# Patient Record
Sex: FEMALE | Race: WHITE | NOT HISPANIC OR LATINO | Employment: STUDENT | URBAN - METROPOLITAN AREA
[De-identification: names, ages, dates, MRNs, and addresses within clinical notes are randomized per-mention and may not be internally consistent; named-entity substitution may affect disease eponyms.]

---

## 2017-08-15 ENCOUNTER — OFFICE VISIT (OUTPATIENT)
Dept: CARDIOLOGY | Facility: MEDICAL CENTER | Age: 22
End: 2017-08-15
Payer: COMMERCIAL

## 2017-08-15 VITALS
DIASTOLIC BLOOD PRESSURE: 66 MMHG | HEIGHT: 66 IN | BODY MASS INDEX: 23.3 KG/M2 | SYSTOLIC BLOOD PRESSURE: 110 MMHG | HEART RATE: 74 BPM | WEIGHT: 145 LBS | OXYGEN SATURATION: 97 %

## 2017-08-15 DIAGNOSIS — R00.2 PALPITATIONS: ICD-10-CM

## 2017-08-15 DIAGNOSIS — Q21.12 PFO (PATENT FORAMEN OVALE): ICD-10-CM

## 2017-08-15 LAB — EKG IMPRESSION: NORMAL

## 2017-08-15 PROCEDURE — 93000 ELECTROCARDIOGRAM COMPLETE: CPT | Performed by: NURSE PRACTITIONER

## 2017-08-15 PROCEDURE — 99214 OFFICE O/P EST MOD 30 MIN: CPT | Performed by: NURSE PRACTITIONER

## 2017-08-15 ASSESSMENT — ENCOUNTER SYMPTOMS
FEVER: 0
MYALGIAS: 0
ORTHOPNEA: 0
NAUSEA: 0
PALPITATIONS: 1
BRUISES/BLEEDS EASILY: 0
PND: 0
LOSS OF CONSCIOUSNESS: 0
SHORTNESS OF BREATH: 0
ABDOMINAL PAIN: 0
HEADACHES: 0
DIZZINESS: 0
COUGH: 0
CHILLS: 0

## 2017-08-15 NOTE — MR AVS SNAPSHOT
"Estrella Sanders   8/15/2017 11:40 AM   Office Visit   MRN: [id number]    Department:  Heart [facility]   Dept Phone:  [phone]    Description:  Female : 1995   Provider:  SHALINI Jacinto           Reason for Visit     New Patient           Allergies as of 8/15/2017     Allergen Noted Reactions    Codeine 2016       Vomiting, rash         You were diagnosed with     PFO (patent foramen ovale)   [471025]         Vital Signs     Blood Pressure Pulse Height Weight Body Mass Index Oxygen Saturation    110/66 mmHg 74 1.676 m (5' 5.98\") 65.772 kg (145 lb) 23.41 kg/m2 97%    Smoking Status                   Never Smoker            Basic Information     Date Of Birth Sex Race Ethnicity Preferred Language    1995 Female White Non- English      Problem List              ICD-10-CM Priority Class Noted - Resolved    PFO (patent foramen ovale) Q21.1   2016 - Present      Health Maintenance        Date Due Completion Dates    IMM HEP B VACCINE (1 of 3 - Primary Series) 1995 ---    IMM HEP A VACCINE (1 of 2 - Standard Series) 1998 ---    IMM HPV VACCINE (1 of 3 - Female 3 Dose Series) 2008 ---    IMM VARICELLA (CHICKENPOX) VACCINE (1 of 2 - 2 Dose Adolescent Series) 2010 ---    IMM MENINGOCOCCAL VACCINE (MCV4) (1 of 1) 2013 ---    IMM DTaP/Tdap/Td Vaccine (1 - Tdap) 2016 ---    IMM INFLUENZA (1) 2017 ---            Results       Current Immunizations     No immunizations on file.      Below and/or attached are the medications your provider expects you to take. Review all of your home medications and newly ordered medications with your provider and/or pharmacist. Follow medication instructions as directed by your provider and/or pharmacist. Please keep your medication list with you and share with your provider. Update the information when medications are discontinued, doses are changed, or new medications (including over-the-counter products) are " added; and carry medication information at all times in the event of emergency situations     Allergies:  CODEINE - (reactions not documented)               Medications  Valid as of: August 15, 2017 - 11:52 AM    Generic Name Brand Name Tablet Size Instructions for use    .                 Medicines prescribed today were sent to:     [facility] [place], [place] - [address] [facility]    [address] [address] [facility] [address] [place] [address]    Phone: [phone] Fax: [phone]    Open 24 Hours?: No      Medication refill instructions:       If your prescription bottle indicates you have medication refills left, it is not necessary to call your provider’s office. Please contact your pharmacy and they will refill your medication.    If your prescription bottle indicates you do not have any refills left, you may request refills at any time through one of the following ways: The online [employer] system (except Urgent Care), by calling your provider’s office, or by asking your pharmacy to contact your provider’s office with a refill request. Medication refills are processed only during regular business hours and may not be available until the next business day. Your provider may request additional information or to have a follow-up visit with you prior to refilling your medication.   *Please Note: Medication refills are assigned a new Rx number when refilled electronically. Your pharmacy may indicate that no refills were authorized even though a new prescription for the same medication is available at the pharmacy. Please request the medicine by name with the pharmacy before contacting your provider for a refill.           [employer] Access Code: Activation code not generated  Current [employer] Status: Active

## 2017-08-15 NOTE — PROGRESS NOTES
Subjective:   Estrella Sanders is a 22 y.o. female who presents today for follow-up of occasional palpitations.    Estrella is a 22 year old female with history of PFO (with no ASD and no significant shunting) normally followed by Dr. Ramos. 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    She is here, as occasionally she notices a skip or fluttering of her heart, for only a few seconds, about once every 3 months. She can't remember anything about circumstances that cause it; no chest pain, pressure or discomfort; no shortness of breath, orthopnea or PND; no dizziness or syncope; no edema. She remains active in sports, and tolerates without any problems. No caffeine intake; no smoking or drug or alcohol use.    Past Medical History   Diagnosis Date   • PFO (patent foramen ovale) July 2016     EMILY with PFO, with no ASD and no significant shunting   • Skin cancer 1999   • Palpitations      Past Surgical History   Procedure Laterality Date   • Other  2015     wisdom teeth removal    • Other  1999     skin CA removal on head   • Recovery  7/8/2016     Procedure: CATH LAB EMILY WITH ANESTHESIA DR. NICHOLSON.L;  Surgeon: [facility];  [place] [facility] [place]  Service:      Family History   Problem Relation Age of Onset   • Heart Failure Neg Hx    • Heart Attack Neg Hx    • Stroke Neg Hx    • Heart Disease Neg Hx      History   Smoking status   • Never Smoker    Smokeless tobacco   • Never Used     Allergies   Allergen Reactions   • Codeine      Vomiting, rash        No outpatient encounter prescriptions on file as of 8/15/2017.     No facility-administered encounter medications on file as of 8/15/2017.     Review of Systems   Constitutional: Negative for fever and chills.   HENT: Negative for congestion.    Respiratory: Negative for cough and shortness of breath.    Cardiovascular: Positive for palpitations. Negative for chest  "pain, orthopnea, leg swelling and PND.        Very rare, nonsustained.   Gastrointestinal: Negative for nausea and abdominal pain.   Musculoskeletal: Negative for myalgias.   Skin: Negative for rash.   Neurological: Negative for dizziness, loss of consciousness and headaches.   Endo/Heme/Allergies: Does not bruise/bleed easily.        Objective:   /66 mmHg  Pulse 74  Ht 1.676 m (5' 5.98\")  Wt 65.772 kg (145 lb)  BMI 23.41 kg/m2  SpO2 97%    Physical Exam   Constitutional: She is oriented to person, place, and time. She appears well-developed and well-nourished. No distress.   HENT:   Head: Normocephalic.   Eyes: Conjunctivae and EOM are normal.   Neck: Normal range of motion. Neck supple. No JVD present. No thyromegaly present.   Cardiovascular: Normal rate, regular rhythm, normal heart sounds and intact distal pulses.  Exam reveals no gallop and no friction rub.    No murmur heard.  Pulmonary/Chest: Effort normal and breath sounds normal. No respiratory distress.   Abdominal: Soft. Bowel sounds are normal. There is no tenderness.   Musculoskeletal: Normal range of motion. She exhibits no edema.   Neurological: She is alert and oriented to person, place, and time.   Skin: Skin is warm and dry. No rash noted. No erythema.   Psychiatric: She has a normal mood and affect. Her behavior is normal.     EKG today reveals sinus rhythm at 64 bpm with no acute ST-T wave changes.    Assessment:     1. PFO (patent foramen ovale)  [facility]   2. Palpitations         Medical Decision Making:  Today's Assessment / Status / Plan:     1. PFO on EMILY, with no ASD or shunting, followed by Dr. Ramos.    2. Palpitations, rare/nonsustained. Reassured regarding EKG today. Discussed possible holter monitor, but she is leaving for college tomorrow. Urged to keep track of any symptoms and circumstances, and let us know whether it is happening more frequently or not. Can consider holter monitor when " she returns, or sooner, if clinical condition changes.    FU as already scheduled.    Collaborating MD: Vaibhav

## 2017-08-15 NOTE — Clinical Note
[facility]   [address]   [address]  [place] [address]  Phone: [phone]  Fax: [phone]              Estrella Sanders  1995    Encounter Date: 8/15/2017    SHALINI Jacinto          PROGRESS NOTE:  Subjective:   Estrella Sanders is a 22 y.o. female who presents today for follow-up of occasional palpitations.    Estrella is a 22 year old female with history of PFO (with no ASD and no significant shunting) normally followed by Dr. Ramos. 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    She is here, as occasionally she notices a skip or fluttering of her heart, for only a few seconds, about once every 3 months. She can't remember anything about circumstances that cause it; no chest pain, pressure or discomfort; no shortness of breath, orthopnea or PND; no dizziness or syncope; no edema. She remains active in sports, and tolerates without any problems. No caffeine intake; no smoking, drug or alcohol use.    Past Medical History   Diagnosis Date   • PFO (patent foramen ovale) July 2016     EMILY with PFO, with no ASD and no significant shunting   • Skin cancer 1999   • Palpitations      Past Surgical History   Procedure Laterality Date   • Other  2015     wisdom teeth removal    • Other  1999     skin CA removal on head   • Recovery  7/8/2016     Procedure: CATH LAB EMILY WITH ANESTHESIA DR. LARIOS;  [facility];  [place]: [facility] [facility] [facility] [place];  Service:      Family History   Problem Relation Age of Onset   • Heart Failure Neg Hx    • Heart Attack Neg Hx    • Stroke Neg Hx    • Heart Disease Neg Hx      History   Smoking status   • Never Smoker    Smokeless tobacco   • Never Used     Allergies   Allergen Reactions   • Codeine      Vomiting, rash        No outpatient encounter prescriptions on file as of 8/15/2017.     No  "facility-administered encounter medications on file as of 8/15/2017.     Review of Systems   Constitutional: Negative for fever and chills.   HENT: Negative for congestion.    Respiratory: Negative for cough and shortness of breath.    Cardiovascular: Positive for palpitations. Negative for chest pain, orthopnea, leg swelling and PND.        Very rare, nonsustained.   Gastrointestinal: Negative for nausea and abdominal pain.   Musculoskeletal: Negative for myalgias.   Skin: Negative for rash.   Neurological: Negative for dizziness, loss of consciousness and headaches.   Endo/Heme/Allergies: Does not bruise/bleed easily.        Objective:   /66 mmHg  Pulse 74  Ht 1.676 m (5' 5.98\")  Wt 65.772 kg (145 lb)  BMI 23.41 kg/m2  SpO2 97%    Physical Exam   Constitutional: She is oriented to person, place, and time. She appears well-developed and well-nourished. No distress.   HENT:   Head: Normocephalic.   Eyes: Conjunctivae and EOM are normal.   Neck: Normal range of motion. Neck supple. No JVD present. No thyromegaly present.   Cardiovascular: Normal rate, regular rhythm, normal heart sounds and intact distal pulses.  Exam reveals no gallop and no friction rub.    No murmur heard.  Pulmonary/Chest: Effort normal and breath sounds normal. No respiratory distress.   Abdominal: Soft. Bowel sounds are normal. There is no tenderness.   Musculoskeletal: Normal range of motion. She exhibits no edema.   Neurological: She is alert and oriented to person, place, and time.   Skin: Skin is warm and dry. No rash noted. No erythema.   Psychiatric: She has a normal mood and affect. Her behavior is normal.     EKG today reveals sinus rhythm at 64 bpm with no acute ST-T wave changes.    Assessment:     1. PFO (patent foramen ovale)  [facility] E[facility] (Clinic Performed)   2. Palpitations         Medical Decision Making:  Today's Assessment / Status / Plan:     1. PFO on EMILY, with no ASD or shunting, followed by Dr." Richard.    2. Palpitations, rare/nonsustained. Reassured regarding EKG today. Discussed possible holter monitor, but she is leaving for college tomorrow. Urged to keep track of any symptoms and circumstances, and let us know whether it is happening more frequently or not. Can consider holter monitor when she returns, or sooner, if clinical condition changes.    FU as already scheduled.    Collaborating MD: Vaibhav Bowens

## 2018-04-25 ENCOUNTER — EH NON-PROVIDER (OUTPATIENT)
Dept: OCCUPATIONAL MEDICINE | Facility: CLINIC | Age: 23
End: 2018-04-25

## 2018-04-25 ENCOUNTER — HOSPITAL ENCOUNTER (OUTPATIENT)
Facility: MEDICAL CENTER | Age: 23
End: 2018-04-25
Attending: PREVENTIVE MEDICINE
Payer: COMMERCIAL

## 2018-04-25 DIAGNOSIS — Z02.89 VISIT FOR OCCUPATIONAL HEALTH EXAMINATION: ICD-10-CM

## 2018-04-25 PROCEDURE — 86480 TB TEST CELL IMMUN MEASURE: CPT | Performed by: PREVENTIVE MEDICINE

## 2018-04-27 LAB
M TB TUBERC IFN-G BLD QL: NEGATIVE
M TB TUBERC IFN-G/MITOGEN IGNF BLD: 0.02
M TB TUBERC IGNF/MITOGEN IGNF CONTROL: 47.76 [IU]/ML
MITOGEN IGNF BCKGRD COR BLD-ACNC: 0.02 [IU]/ML

## 2018-05-09 ENCOUNTER — EH NON-PROVIDER (OUTPATIENT)
Dept: OCCUPATIONAL MEDICINE | Facility: CLINIC | Age: 23
End: 2018-05-09

## 2018-05-09 DIAGNOSIS — Z71.85 IMMUNIZATION COUNSELING: ICD-10-CM

## 2018-05-09 PROCEDURE — 8916 PR CLEARANCE ONLY: Performed by: PREVENTIVE MEDICINE

## 2018-06-12 ENCOUNTER — OFFICE VISIT (OUTPATIENT)
Dept: CARDIOLOGY | Facility: MEDICAL CENTER | Age: 23
End: 2018-06-12
Payer: COMMERCIAL

## 2018-06-12 VITALS
DIASTOLIC BLOOD PRESSURE: 62 MMHG | HEIGHT: 66 IN | SYSTOLIC BLOOD PRESSURE: 102 MMHG | OXYGEN SATURATION: 100 % | HEART RATE: 76 BPM

## 2018-06-12 DIAGNOSIS — Z83.49 FAMILY HISTORY OF HYPOTHYROIDISM: ICD-10-CM

## 2018-06-12 DIAGNOSIS — Q21.12 PFO (PATENT FORAMEN OVALE): ICD-10-CM

## 2018-06-12 DIAGNOSIS — R06.09 DYSPNEA ON EXERTION: ICD-10-CM

## 2018-06-12 DIAGNOSIS — R00.2 PALPITATIONS: ICD-10-CM

## 2018-06-12 PROCEDURE — 99214 OFFICE O/P EST MOD 30 MIN: CPT | Performed by: NURSE PRACTITIONER

## 2018-06-12 ASSESSMENT — ENCOUNTER SYMPTOMS
COUGH: 0
WHEEZING: 1
FEVER: 0
PALPITATIONS: 1
SHORTNESS OF BREATH: 1
ABDOMINAL PAIN: 0
PND: 0
CLAUDICATION: 0
MYALGIAS: 0
ORTHOPNEA: 0

## 2018-06-12 NOTE — PROGRESS NOTES
Chief Complaint   Patient presents with   • Atrial Septal Defect     Subjective:   Estrella Sanders is a 22 y.o. female who presents  today for follow up for incidental finding of PFO on sports clearance echocardiogram.    She is a prior patient of Dr. Ramos, she is wanting to establish with Dr. Ysabel Nicholson in our office.    Hx of PFO (small) with no shunting, palpitations, chest pain, and HOLT.    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    She states she does get some exertional wheezing and dyspnea with very heavy exertion.     She also gets occasional palpitations and has noticed that when she laughs too hard, she gets chest pain that hurts with deep breathing.    Past Medical History:   Diagnosis Date   • Palpitations    • PFO (patent foramen ovale) 07/2016    EMILY with PFO, with no ASD and no significant shunting   • Skin cancer 1999     Past Surgical History:   Procedure Laterality Date   • RECOVERY  7/8/2016    Procedure: CATH LAB EMILY WITH ANESTHESIA DR. NICHOLSON.L;  Surgeon: [facility];  [place] [facility] [place]  Service:    • OTHER  2015    wisdom teeth removal    • OTHER  1999    skin CA removal on head     Family History   Problem Relation Age of Onset   • Heart Failure Neg Hx    • Heart Attack Neg Hx    • Stroke Neg Hx    • Heart Disease Neg Hx      Social History     Social History   • Marital status: Single     Spouse name: N/A   • Number of children: N/A   • Years of education: N/A     Occupational History   • Not on file.     Social History Main Topics   • Smoking status: Never Smoker   • Smokeless tobacco: Never Used   • Alcohol use No   • Drug use: No   • Sexual activity: Not on file     Other Topics Concern   • Not on file     Social History Narrative   • No narrative on file     Allergies   Allergen Reactions   • Codeine      Vomiting, rash        No outpatient encounter prescriptions on file as of 6/12/2018.     No  "facility-administered encounter medications on file as of 6/12/2018.      Review of Systems   Constitutional: Negative for fever and malaise/fatigue.   Respiratory: Positive for shortness of breath and wheezing. Negative for cough.         With heavy exertion causing wheezing   Cardiovascular: Positive for chest pain and palpitations. Negative for orthopnea, claudication, leg swelling and PND.        Chest pain after laughing too hard with inhalation, palpitations rare   Gastrointestinal: Negative for abdominal pain.   Musculoskeletal: Negative for myalgias.        Objective:   /62   Pulse 76   Ht 1.676 m (5' 5.98\")   SpO2 100%     Physical Exam   Constitutional: She is oriented to person, place, and time. She appears well-developed and well-nourished.   HENT:   Head: Normocephalic and atraumatic.   Eyes: EOM are normal.   Neck: No JVD present.   Cardiovascular: Normal rate, regular rhythm, normal heart sounds and intact distal pulses.    Pulmonary/Chest: Effort normal and breath sounds normal.   Musculoskeletal: Normal range of motion. She exhibits no edema.   Neurological: She is alert and oriented to person, place, and time.   Skin: Skin is warm and dry.   Nursing note and vitals reviewed.      Assessment:     1. Palpitations  ECHOCARDIOGRAM COMP W/O CONT    CBC WITHOUT DIFFERENTIAL    COMP METABOLIC PANEL    THYROID PANEL WITH TSH    MAGNESIUM   2. PFO (patent foramen ovale)  ECHOCARDIOGRAM COMP W/O CONT   3. Dyspnea on exertion  ECHOCARDIOGRAM COMP W/O CONT    MAGNESIUM   4. Family history of hypothyroidism  CBC WITHOUT DIFFERENTIAL    COMP METABOLIC PANEL    THYROID PANEL WITH TSH    MAGNESIUM     Medical Decision Making:  Today's Assessment / Status / Plan:     1. 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 We will await echocardiogram results and follow up yearly with Dr. Villeda.

## 2018-06-12 NOTE — LETTER
[facility]   [address]  [place] [address]  Phone: [phone]  Fax: [phone]              Estrella Sanders  1995    Encounter Date: 6/12/2018    ADAM Romo          PROGRESS NOTE:  Chief Complaint   Patient presents with   • Atrial Septal Defect     Subjective:   Estrella Sanders is a 22 y.o. female who presents  today for follow up for incidental finding of PFO on sports clearance echocardiogram.    She is a prior patient of Dr. Ramos, she is wanting to establish with Dr. Ysabel Nicholson in our office.    Hx of PFO (small) with no shunting, palpitations, chest pain, and HOLT.    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    She states she does get some exertional wheezing and dyspnea with very heavy exertion.     She also gets occasional palpitations and has noticed that when she laughs too hard, she gets chest pain that hurts with deep breathing.    Past Medical History:   Diagnosis Date   • Palpitations    • PFO (patent foramen ovale) 07/2016    EMILY with PFO, with no ASD and no significant shunting   • Skin cancer 1999     Past Surgical History:   Procedure Laterality Date   • RECOVERY  7/8/2016    Procedure: CATH LAB EMILY WITH ANESTHESIA DR. NICHOLSON.RONIT;  Surgeon: [facility];  [place] [facility] [place]  Service:    • OTHER  2015    wisdom teeth removal    • OTHER  1999    skin CA removal on head     Family History   Problem Relation Age of Onset   • Heart Failure Neg Hx    • Heart Attack Neg Hx    • Stroke Neg Hx    • Heart Disease Neg Hx      Social History     Social History   • Marital status: Single     Spouse name: N/A   • Number of children: N/A   • Years of education: N/A     Occupational History   • Not on file.     Social History Main Topics   • Smoking status: Never Smoker   • Smokeless tobacco: Never Used   • Alcohol use No   • Drug use: No   •  "Sexual activity: Not on file     Other Topics Concern   • Not on file     Social History Narrative   • No narrative on file     Allergies   Allergen Reactions   • Codeine      Vomiting, rash        No outpatient encounter prescriptions on file as of 6/12/2018.     No facility-administered encounter medications on file as of 6/12/2018.      Review of Systems   Constitutional: Negative for fever and malaise/fatigue.   Respiratory: Positive for shortness of breath and wheezing. Negative for cough.         With heavy exertion causing wheezing   Cardiovascular: Positive for chest pain and palpitations. Negative for orthopnea, claudication, leg swelling and PND.        Chest pain after laughing too hard with inhalation, palpitations rare   Gastrointestinal: Negative for abdominal pain.   Musculoskeletal: Negative for myalgias.        Objective:   /62   Pulse 76   Ht 1.676 m (5' 5.98\")   SpO2 100%     Physical Exam   Constitutional: She is oriented to person, place, and time. She appears well-developed and well-nourished.   HENT:   Head: Normocephalic and atraumatic.   Eyes: EOM are normal.   Neck: No JVD present.   Cardiovascular: Normal rate, regular rhythm, normal heart sounds and intact distal pulses.    Pulmonary/Chest: Effort normal and breath sounds normal.   Musculoskeletal: Normal range of motion. She exhibits no edema.   Neurological: She is alert and oriented to person, place, and time.   Skin: Skin is warm and dry.   Nursing note and vitals reviewed.      Assessment:     1. Palpitations  ECHOCARDIOGRAM COMP W/O CONT    CBC WITHOUT DIFFERENTIAL    COMP METABOLIC PANEL    THYROID PANEL WITH TSH    MAGNESIUM   2. PFO (patent foramen ovale)  ECHOCARDIOGRAM COMP W/O CONT   3. Dyspnea on exertion  ECHOCARDIOGRAM COMP W/O CONT    MAGNESIUM   4. Family history of hypothyroidism  CBC WITHOUT DIFFERENTIAL    COMP METABOLIC PANEL    THYROID PANEL WITH TSH    MAGNESIUM     Medical Decision Making:  Today's " Assessment / Status / Plan:     1. 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 We will await echocardiogram results and follow up yearly with Dr. Villeda.        No Recipients

## 2018-07-06 ENCOUNTER — HOSPITAL ENCOUNTER (OUTPATIENT)
Dept: CARDIOLOGY | Facility: MEDICAL CENTER | Age: 23
End: 2018-07-06
Attending: NURSE PRACTITIONER
Payer: COMMERCIAL

## 2018-07-06 DIAGNOSIS — Q21.12 PFO (PATENT FORAMEN OVALE): ICD-10-CM

## 2018-07-06 DIAGNOSIS — R00.2 PALPITATIONS: ICD-10-CM

## 2018-07-06 DIAGNOSIS — R06.09 DYSPNEA ON EXERTION: ICD-10-CM

## 2018-07-06 LAB
LV EJECT FRACT  99904: 60
LV EJECT FRACT MOD 2C 99903: 59.43
LV EJECT FRACT MOD 4C 99902: 56.28
LV EJECT FRACT MOD BP 99901: 58.95

## 2018-07-06 PROCEDURE — 93306 TTE W/DOPPLER COMPLETE: CPT | Mod: 26 | Performed by: INTERNAL MEDICINE

## 2018-07-06 PROCEDURE — 93306 TTE W/DOPPLER COMPLETE: CPT

## 2018-07-18 ENCOUNTER — TELEPHONE (OUTPATIENT)
Dept: CARDIOLOGY | Facility: MEDICAL CENTER | Age: 23
End: 2018-07-18

## 2018-07-18 NOTE — TELEPHONE ENCOUNTER
echo results   Received: Today   Message Contents   Enid Corley R.N.   Phone Number: [phone]             SC/kyler     Pt calling to discuss the echo results she received & reviewed on her MyChart, echo was done on 7/6.  Please call Estrella        Notes recorded by ADAM Romo on 7/6/2018 at 12:18 PM PDT  Let her know her echo shows no PFO or any sign of shunting. Great news! FU with [place] as planned. SC    -------------------------------------------------------------------------  S/w SC to clarify and although there was a PFO found on EMILY in 2016 since pt is so young the PFO appears to have closed.     S/w pt and discussed results of recent echo compared to EMILY done in 2016. 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 Pt asked about follow-up with cardiology. Advised pt to follow-up as needed but no follow up needing to be scheduled at this time. Discussed what worrisome symptoms would be and when needing to follow up.

## 2019-06-03 ENCOUNTER — HOSPITAL ENCOUNTER (OUTPATIENT)
Dept: LAB | Facility: MEDICAL CENTER | Age: 24
End: 2019-06-03
Attending: PHYSICIAN ASSISTANT
Payer: COMMERCIAL

## 2019-06-03 ENCOUNTER — HOSPITAL ENCOUNTER (OUTPATIENT)
Dept: LAB | Facility: MEDICAL CENTER | Age: 24
End: 2019-06-03
Attending: FAMILY MEDICINE
Payer: COMMERCIAL

## 2019-06-03 LAB
HBV SURFACE AG SER QL: NEGATIVE
VZV IGG SER IA-ACNC: 0.86

## 2019-06-03 PROCEDURE — 86480 TB TEST CELL IMMUN MEASURE: CPT

## 2019-06-03 PROCEDURE — 86735 MUMPS ANTIBODY: CPT

## 2019-06-03 PROCEDURE — 87340 HEPATITIS B SURFACE AG IA: CPT

## 2019-06-03 PROCEDURE — 86706 HEP B SURFACE ANTIBODY: CPT

## 2019-06-03 PROCEDURE — 36415 COLL VENOUS BLD VENIPUNCTURE: CPT

## 2019-06-03 PROCEDURE — 86765 RUBEOLA ANTIBODY: CPT

## 2019-06-03 PROCEDURE — 86787 VARICELLA-ZOSTER ANTIBODY: CPT

## 2019-06-03 PROCEDURE — 86762 RUBELLA ANTIBODY: CPT

## 2019-06-04 LAB
MEV IGG SER IA-ACNC: 1.38
MUV IGG SER IA-ACNC: 0.49
RUBV AB SER QL: 21.1 IU/ML

## 2019-06-05 LAB
GAMMA INTERFERON BACKGROUND BLD IA-ACNC: 0.02 IU/ML
M TB IFN-G BLD-IMP: NEGATIVE
M TB IFN-G CD4+ BCKGRND COR BLD-ACNC: 0.05 IU/ML
MITOGEN IGNF BCKGRD COR BLD-ACNC: >10 IU/ML
QFT TB2 - NIL TBQ2: 0.05 IU/ML

## 2019-06-07 LAB — HBV SURFACE AB SERPL IA-ACNC: <3.1 MIU/ML (ref 0–10)

## 2020-05-20 ENCOUNTER — HOSPITAL ENCOUNTER (OUTPATIENT)
Dept: LAB | Facility: MEDICAL CENTER | Age: 25
End: 2020-05-20
Attending: FAMILY MEDICINE
Payer: COMMERCIAL

## 2020-05-20 LAB
ALBUMIN SERPL BCP-MCNC: 5 G/DL (ref 3.2–4.9)
ALBUMIN/GLOB SERPL: 2 G/DL
ALP SERPL-CCNC: 56 U/L (ref 30–99)
ALT SERPL-CCNC: 14 U/L (ref 2–50)
ANION GAP SERPL CALC-SCNC: 13 MMOL/L (ref 7–16)
AST SERPL-CCNC: 20 U/L (ref 12–45)
BASOPHILS # BLD AUTO: 0.6 % (ref 0–1.8)
BASOPHILS # BLD: 0.04 K/UL (ref 0–0.12)
BILIRUB SERPL-MCNC: 0.6 MG/DL (ref 0.1–1.5)
BUN SERPL-MCNC: 11 MG/DL (ref 8–22)
CALCIUM SERPL-MCNC: 9.4 MG/DL (ref 8.5–10.5)
CHLORIDE SERPL-SCNC: 102 MMOL/L (ref 96–112)
CO2 SERPL-SCNC: 22 MMOL/L (ref 20–33)
CREAT SERPL-MCNC: 0.82 MG/DL (ref 0.5–1.4)
EOSINOPHIL # BLD AUTO: 0.11 K/UL (ref 0–0.51)
EOSINOPHIL NFR BLD: 1.6 % (ref 0–6.9)
ERYTHROCYTE [DISTWIDTH] IN BLOOD BY AUTOMATED COUNT: 41.6 FL (ref 35.9–50)
FASTING STATUS PATIENT QL REPORTED: NORMAL
GLOBULIN SER CALC-MCNC: 2.5 G/DL (ref 1.9–3.5)
GLUCOSE SERPL-MCNC: 83 MG/DL (ref 65–99)
HAV IGM SERPL QL IA: NORMAL
HBV CORE IGM SER QL: NORMAL
HBV SURFACE AG SER QL: NORMAL
HCT VFR BLD AUTO: 44.3 % (ref 37–47)
HCV AB SER QL: NORMAL
HGB BLD-MCNC: 14.3 G/DL (ref 12–16)
HIV 1+2 AB+HIV1 P24 AG SERPL QL IA: NORMAL
IMM GRANULOCYTES # BLD AUTO: 0.02 K/UL (ref 0–0.11)
IMM GRANULOCYTES NFR BLD AUTO: 0.3 % (ref 0–0.9)
LYMPHOCYTES # BLD AUTO: 3.12 K/UL (ref 1–4.8)
LYMPHOCYTES NFR BLD: 44.4 % (ref 22–41)
MCH RBC QN AUTO: 29.8 PG (ref 27–33)
MCHC RBC AUTO-ENTMCNC: 32.3 G/DL (ref 33.6–35)
MCV RBC AUTO: 92.3 FL (ref 81.4–97.8)
MONOCYTES # BLD AUTO: 0.57 K/UL (ref 0–0.85)
MONOCYTES NFR BLD AUTO: 8.1 % (ref 0–13.4)
NEUTROPHILS # BLD AUTO: 3.17 K/UL (ref 2–7.15)
NEUTROPHILS NFR BLD: 45 % (ref 44–72)
NRBC # BLD AUTO: 0 K/UL
NRBC BLD-RTO: 0 /100 WBC
PLATELET # BLD AUTO: 229 K/UL (ref 164–446)
PMV BLD AUTO: 11.3 FL (ref 9–12.9)
POTASSIUM SERPL-SCNC: 3.6 MMOL/L (ref 3.6–5.5)
PROT SERPL-MCNC: 7.5 G/DL (ref 6–8.2)
RBC # BLD AUTO: 4.8 M/UL (ref 4.2–5.4)
SODIUM SERPL-SCNC: 137 MMOL/L (ref 135–145)
WBC # BLD AUTO: 7 K/UL (ref 4.8–10.8)

## 2020-05-20 PROCEDURE — 87491 CHLMYD TRACH DNA AMP PROBE: CPT

## 2020-05-20 PROCEDURE — 80074 ACUTE HEPATITIS PANEL: CPT

## 2020-05-20 PROCEDURE — 86480 TB TEST CELL IMMUN MEASURE: CPT

## 2020-05-20 PROCEDURE — 87591 N.GONORRHOEAE DNA AMP PROB: CPT

## 2020-05-20 PROCEDURE — 86780 TREPONEMA PALLIDUM: CPT

## 2020-05-20 PROCEDURE — 85025 COMPLETE CBC W/AUTO DIFF WBC: CPT

## 2020-05-20 PROCEDURE — 80053 COMPREHEN METABOLIC PANEL: CPT

## 2020-05-20 PROCEDURE — 36415 COLL VENOUS BLD VENIPUNCTURE: CPT

## 2020-05-20 PROCEDURE — 87389 HIV-1 AG W/HIV-1&-2 AB AG IA: CPT

## 2020-05-21 LAB
C TRACH DNA SPEC QL NAA+PROBE: NEGATIVE
N GONORRHOEA DNA SPEC QL NAA+PROBE: NEGATIVE
SPECIMEN SOURCE: NORMAL
TREPONEMA PALLIDUM IGG+IGM AB [PRESENCE] IN SERUM OR PLASMA BY IMMUNOASSAY: NORMAL

## 2020-05-22 LAB
GAMMA INTERFERON BACKGROUND BLD IA-ACNC: 0.02 IU/ML
M TB IFN-G BLD-IMP: NEGATIVE
M TB IFN-G CD4+ BCKGRND COR BLD-ACNC: 0.04 IU/ML
MITOGEN IGNF BCKGRD COR BLD-ACNC: >10 IU/ML
QFT TB2 - NIL TBQ2: 0.04 IU/ML

## 2020-12-23 ENCOUNTER — APPOINTMENT (RX ONLY)
Dept: URBAN - METROPOLITAN AREA CLINIC 31 | Facility: CLINIC | Age: 25
Setting detail: DERMATOLOGY
End: 2020-12-23

## 2020-12-23 DIAGNOSIS — L70.0 ACNE VULGARIS: ICD-10-CM

## 2020-12-23 PROCEDURE — 99202 OFFICE O/P NEW SF 15 MIN: CPT

## 2020-12-23 PROCEDURE — ? ADDITIONAL NOTES

## 2020-12-23 PROCEDURE — ? PRESCRIPTION

## 2020-12-23 PROCEDURE — ? COUNSELING

## 2020-12-23 RX ORDER — ADAPALENE AND BENZOYL PEROXIDE 1; 25 MG/G; MG/G
GEL TOPICAL AT NIGHT
Qty: 1 | Refills: 12 | Status: ERX | COMMUNITY
Start: 2020-12-23

## 2020-12-23 RX ADMIN — ADAPALENE AND BENZOYL PEROXIDE: 1; 25 GEL TOPICAL at 00:00

## 2020-12-23 ASSESSMENT — LOCATION DETAILED DESCRIPTION DERM
LOCATION DETAILED: SUBMENTAL CHIN
LOCATION DETAILED: RIGHT SUPERIOR MEDIAL UPPER BACK
LOCATION DETAILED: LEFT SUBMANDIBULAR AREA
LOCATION DETAILED: RIGHT SUBMANDIBULAR AREA
LOCATION DETAILED: RIGHT POSTERIOR SHOULDER
LOCATION DETAILED: LEFT POSTERIOR SHOULDER

## 2020-12-23 ASSESSMENT — LOCATION SIMPLE DESCRIPTION DERM
LOCATION SIMPLE: RIGHT SUBMANDIBULAR AREA
LOCATION SIMPLE: LEFT SHOULDER
LOCATION SIMPLE: RIGHT SHOULDER
LOCATION SIMPLE: LEFT SUBMANDIBULAR AREA
LOCATION SIMPLE: SUBMENTAL CHIN
LOCATION SIMPLE: RIGHT UPPER BACK

## 2020-12-23 ASSESSMENT — LOCATION ZONE DERM
LOCATION ZONE: FACE
LOCATION ZONE: TRUNK
LOCATION ZONE: ARM

## 2020-12-23 NOTE — PROCEDURE: ADDITIONAL NOTES
Detail Level: Simple
Additional Notes: Wash hair and then conditioner, make sure fully rinsed, put hair up then wash chest, back, and face.\\nUse otc neutrogena acne wash to wash the body  only, mild FF non medicated cleanser on face \\nBegin using Epiduo nightly to face, if to drying apply every other night for two weeks then every night as tolerated\\nUse sun protection.  DC if planning or if pregnant

## 2020-12-23 NOTE — PROCEDURE: COUNSELING
Isotretinoin Pregnancy And Lactation Text: This medication is Pregnancy Category X and is considered extremely dangerous during pregnancy. It is unknown if it is excreted in breast milk.
Topical Clindamycin Pregnancy And Lactation Text: This medication is Pregnancy Category B and is considered safe during pregnancy. It is unknown if it is excreted in breast milk.
Tetracycline Counseling: Patient counseled regarding possible photosensitivity and increased risk for sunburn.  Patient instructed to avoid sunlight, if possible.  When exposed to sunlight, patients should wear protective clothing, sunglasses, and sunscreen.  The patient was instructed to call the office immediately if the following severe adverse effects occur:  hearing changes, easy bruising/bleeding, severe headache, or vision changes.  The patient verbalized understanding of the proper use and possible adverse effects of tetracycline.  All of the patient's questions and concerns were addressed. Patient understands to avoid pregnancy while on therapy due to potential birth defects.
Birth Control Pills Counseling: Birth Control Pill Counseling: I discussed with the patient the potential side effects of OCPs including but not limited to increased risk of stroke, heart attack, thrombophlebitis, deep venous thrombosis, hepatic adenomas, breast changes, GI upset, headaches, and depression.  The patient verbalized understanding of the proper use and possible adverse effects of OCPs. All of the patient's questions and concerns were addressed.
Azithromycin Counseling:  I discussed with the patient the risks of azithromycin including but not limited to GI upset, allergic reaction, drug rash, diarrhea, and yeast infections.
Sarecycline Counseling: Patient advised regarding possible photosensitivity and discoloration of the teeth, skin, lips, tongue and gums.  Patient instructed to avoid sunlight, if possible.  When exposed to sunlight, patients should wear protective clothing, sunglasses, and sunscreen.  The patient was instructed to call the office immediately if the following severe adverse effects occur:  hearing changes, easy bruising/bleeding, severe headache, or vision changes.  The patient verbalized understanding of the proper use and possible adverse effects of sarecycline.  All of the patient's questions and concerns were addressed.
Topical Retinoid Pregnancy And Lactation Text: This medication is Pregnancy Category C. It is unknown if this medication is excreted in breast milk.
Birth Control Pills Pregnancy And Lactation Text: This medication should be avoided if pregnant and for the first 30 days post-partum.
Doxycycline Pregnancy And Lactation Text: This medication is Pregnancy Category D and not consider safe during pregnancy. It is also excreted in breast milk but is considered safe for shorter treatment courses.
Detail Level: Zone
Topical Sulfur Applications Counseling: Topical Sulfur Counseling: Patient counseled that this medication may cause skin irritation or allergic reactions.  In the event of skin irritation, the patient was advised to reduce the amount of the drug applied or use it less frequently.   The patient verbalized understanding of the proper use and possible adverse effects of topical sulfur application.  All of the patient's questions and concerns were addressed.
High Dose Vitamin A Counseling: Side effects reviewed, pt to contact office should one occur.
Tetracycline Pregnancy And Lactation Text: This medication is Pregnancy Category D and not consider safe during pregnancy. It is also excreted in breast milk.
Benzoyl Peroxide Counseling: Patient counseled that medicine may cause skin irritation and bleach clothing.  In the event of skin irritation, the patient was advised to reduce the amount of the drug applied or use it less frequently.   The patient verbalized understanding of the proper use and possible adverse effects of benzoyl peroxide.  All of the patient's questions and concerns were addressed.
Erythromycin Counseling:  I discussed with the patient the risks of erythromycin including but not limited to GI upset, allergic reaction, drug rash, diarrhea, increase in liver enzymes, and yeast infections.
Azithromycin Pregnancy And Lactation Text: This medication is considered safe during pregnancy and is also secreted in breast milk.
Tazorac Counseling:  Patient advised that medication is irritating and drying.  Patient may need to apply sparingly and wash off after an hour before eventually leaving it on overnight.  The patient verbalized understanding of the proper use and possible adverse effects of tazorac.  All of the patient's questions and concerns were addressed.
Erythromycin Pregnancy And Lactation Text: This medication is Pregnancy Category B and is considered safe during pregnancy. It is also excreted in breast milk.
Tazorac Pregnancy And Lactation Text: This medication is not safe during pregnancy. It is unknown if this medication is excreted in breast milk.
Bactrim Counseling:  I discussed with the patient the risks of sulfa antibiotics including but not limited to GI upset, allergic reaction, drug rash, diarrhea, dizziness, photosensitivity, and yeast infections.  Rarely, more serious reactions can occur including but not limited to aplastic anemia, agranulocytosis, methemoglobinemia, blood dyscrasias, liver or kidney failure, lung infiltrates or desquamative/blistering drug rashes.
High Dose Vitamin A Pregnancy And Lactation Text: High dose vitamin A therapy is contraindicated during pregnancy and breast feeding.
Dapsone Counseling: I discussed with the patient the risks of dapsone including but not limited to hemolytic anemia, agranulocytosis, rashes, methemoglobinemia, kidney failure, peripheral neuropathy, headaches, GI upset, and liver toxicity.  Patients who start dapsone require monitoring including baseline LFTs and weekly CBCs for the first month, then every month thereafter.  The patient verbalized understanding of the proper use and possible adverse effects of dapsone.  All of the patient's questions and concerns were addressed.
Topical Sulfur Applications Pregnancy And Lactation Text: This medication is Pregnancy Category C and has an unknown safety profile during pregnancy. It is unknown if this topical medication is excreted in breast milk.
Minocycline Counseling: Patient advised regarding possible photosensitivity and discoloration of the teeth, skin, lips, tongue and gums.  Patient instructed to avoid sunlight, if possible.  When exposed to sunlight, patients should wear protective clothing, sunglasses, and sunscreen.  The patient was instructed to call the office immediately if the following severe adverse effects occur:  hearing changes, easy bruising/bleeding, severe headache, or vision changes.  The patient verbalized understanding of the proper use and possible adverse effects of minocycline.  All of the patient's questions and concerns were addressed.
Spironolactone Counseling: Patient advised regarding risks of diarrhea, abdominal pain, hyperkalemia, birth defects (for female patients), liver toxicity and renal toxicity. The patient may need blood work to monitor liver and kidney function and potassium levels while on therapy. The patient verbalized understanding of the proper use and possible adverse effects of spironolactone.  All of the patient's questions and concerns were addressed.
Spironolactone Pregnancy And Lactation Text: This medication can cause feminization of the male fetus and should be avoided during pregnancy. The active metabolite is also found in breast milk.
Bactrim Pregnancy And Lactation Text: This medication is Pregnancy Category D and is known to cause fetal risk.  It is also excreted in breast milk.
Dapsone Pregnancy And Lactation Text: This medication is Pregnancy Category C and is not considered safe during pregnancy or breast feeding.
Topical Clindamycin Counseling: Patient counseled that this medication may cause skin irritation or allergic reactions.  In the event of skin irritation, the patient was advised to reduce the amount of the drug applied or use it less frequently.   The patient verbalized understanding of the proper use and possible adverse effects of clindamycin.  All of the patient's questions and concerns were addressed.
Benzoyl Peroxide Pregnancy And Lactation Text: This medication is Pregnancy Category C. It is unknown if benzoyl peroxide is excreted in breast milk.
Doxycycline Counseling:  Patient counseled regarding possible photosensitivity and increased risk for sunburn.  Patient instructed to avoid sunlight, if possible.  When exposed to sunlight, patients should wear protective clothing, sunglasses, and sunscreen.  The patient was instructed to call the office immediately if the following severe adverse effects occur:  hearing changes, easy bruising/bleeding, severe headache, or vision changes.  The patient verbalized understanding of the proper use and possible adverse effects of doxycycline.  All of the patient's questions and concerns were addressed.
Topical Retinoid counseling:  Patient advised to apply a pea-sized amount only at bedtime and wait 30 minutes after washing their face before applying.  If too drying, patient may add a non-comedogenic moisturizer. The patient verbalized understanding of the proper use and possible adverse effects of retinoids.  All of the patient's questions and concerns were addressed.
Include Pregnancy/Lactation Warning?: No
Isotretinoin Counseling: Patient should get monthly blood tests, not donate blood, not drive at night if vision affected, not share medication, and not undergo elective surgery for 6 months after tx completed. Side effects reviewed, pt to contact office should one occur.

## 2020-12-23 NOTE — HPI: PIMPLES (ACNE)
How Severe Is Your Acne?: mild
Is This A New Presentation, Or A Follow-Up?: Acne
What Type Of Note Output Would You Prefer (Optional)?: Standard Output
Females Only: When Was Your Last Menstrual Period?: 12/10/2020

## 2021-05-12 ENCOUNTER — HOSPITAL ENCOUNTER (OUTPATIENT)
Dept: LAB | Facility: MEDICAL CENTER | Age: 26
End: 2021-05-12
Attending: FAMILY MEDICINE
Payer: COMMERCIAL

## 2021-05-12 PROCEDURE — 86480 TB TEST CELL IMMUN MEASURE: CPT

## 2021-05-12 PROCEDURE — 36415 COLL VENOUS BLD VENIPUNCTURE: CPT

## 2021-05-13 LAB
GAMMA INTERFERON BACKGROUND BLD IA-ACNC: 0.02 IU/ML
M TB IFN-G BLD-IMP: NEGATIVE
M TB IFN-G CD4+ BCKGRND COR BLD-ACNC: 0.01 IU/ML
MITOGEN IGNF BCKGRD COR BLD-ACNC: >10 IU/ML
QFT TB2 - NIL TBQ2: 0.01 IU/ML